# Patient Record
Sex: FEMALE | Race: WHITE
[De-identification: names, ages, dates, MRNs, and addresses within clinical notes are randomized per-mention and may not be internally consistent; named-entity substitution may affect disease eponyms.]

---

## 2017-04-23 ENCOUNTER — HOSPITAL ENCOUNTER (EMERGENCY)
Dept: HOSPITAL 4 - SED | Age: 47
Discharge: HOME | End: 2017-04-23
Payer: MEDICAID

## 2017-04-23 VITALS — WEIGHT: 159 LBS | HEIGHT: 62 IN | BODY MASS INDEX: 29.26 KG/M2

## 2017-04-23 VITALS — SYSTOLIC BLOOD PRESSURE: 131 MMHG
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
## 2017-04-23 DIAGNOSIS — S60.221A: Primary | ICD-10-CM
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
## 2017-04-23 DIAGNOSIS — Z88.1: ICD-10-CM

## 2017-04-23 NOTE — NUR
Patient given written and verbal discharge instructions and verbalizes 
understanding.  ER MD Patel discussed with patient the results and treatment 
provided.  Patient in stable condition. ID arm band removed.  

Rx of motrin given. Patient educated on pain management and to follow up with 
PMD. Pain Scale 0/10.

Opportunity for questions provided and answered.

## 2017-04-23 NOTE — NUR
-------------------------------------------------------------------------------

            *** Note undone in ED - 04/23/17 at 1545 by MOHAN ***            

-------------------------------------------------------------------------------

Pt placed to [facility] 08. Report given to YANETH Jimenez.

## 2017-09-28 ENCOUNTER — HOSPITAL ENCOUNTER (EMERGENCY)
Dept: HOSPITAL 4 - SED | Age: 47
Discharge: HOME | End: 2017-09-28
Payer: MEDICAID

## 2017-09-28 VITALS — WEIGHT: 160 LBS | BODY MASS INDEX: 29.44 KG/M2 | HEIGHT: 62 IN

## 2017-09-28 VITALS — SYSTOLIC BLOOD PRESSURE: 140 MMHG
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
## 2017-09-28 DIAGNOSIS — S43.402A: Primary | ICD-10-CM
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
## 2020-01-09 ENCOUNTER — HOSPITAL ENCOUNTER (INPATIENT)
Dept: HOSPITAL 26 - MED | Age: 50
LOS: 3 days | Discharge: HOME | DRG: 282 | End: 2020-01-12
Payer: COMMERCIAL

## 2020-01-09 ENCOUNTER — HOSPITAL ENCOUNTER (EMERGENCY)
Dept: HOSPITAL 26 - MED | Age: 50
Discharge: HOME | End: 2020-01-09
Payer: COMMERCIAL

## 2020-01-09 VITALS — WEIGHT: 155 LBS | HEIGHT: 63 IN | BODY MASS INDEX: 27.46 KG/M2

## 2020-01-09 VITALS — SYSTOLIC BLOOD PRESSURE: 153 MMHG | DIASTOLIC BLOOD PRESSURE: 77 MMHG

## 2020-01-09 VITALS — DIASTOLIC BLOOD PRESSURE: 87 MMHG | SYSTOLIC BLOOD PRESSURE: 140 MMHG

## 2020-01-09 VITALS — SYSTOLIC BLOOD PRESSURE: 143 MMHG | DIASTOLIC BLOOD PRESSURE: 78 MMHG

## 2020-01-09 VITALS — HEIGHT: 63 IN | WEIGHT: 155 LBS | BODY MASS INDEX: 27.46 KG/M2

## 2020-01-09 VITALS — SYSTOLIC BLOOD PRESSURE: 153 MMHG | DIASTOLIC BLOOD PRESSURE: 81 MMHG

## 2020-01-09 VITALS — DIASTOLIC BLOOD PRESSURE: 85 MMHG | SYSTOLIC BLOOD PRESSURE: 151 MMHG

## 2020-01-09 DIAGNOSIS — K57.30: ICD-10-CM

## 2020-01-09 DIAGNOSIS — K76.0: ICD-10-CM

## 2020-01-09 DIAGNOSIS — Z90.49: ICD-10-CM

## 2020-01-09 DIAGNOSIS — D50.9: ICD-10-CM
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
## 2020-01-09 DIAGNOSIS — E44.1: ICD-10-CM

## 2020-01-09 DIAGNOSIS — K75.89: ICD-10-CM

## 2020-01-09 DIAGNOSIS — Z79.899: ICD-10-CM

## 2020-01-09 DIAGNOSIS — K85.90: Primary | ICD-10-CM

## 2020-01-09 DIAGNOSIS — G89.4: ICD-10-CM

## 2020-01-09 DIAGNOSIS — M06.9: ICD-10-CM

## 2020-01-09 DIAGNOSIS — T45.1X5A: ICD-10-CM

## 2020-01-09 DIAGNOSIS — K59.00: Primary | ICD-10-CM

## 2020-01-09 LAB
ALBUMIN FLD-MCNC: 2.3 G/DL (ref 3.4–5)
ANION GAP SERPL CALCULATED.3IONS-SCNC: 10.6 MMOL/L (ref 8–16)
AST SERPL-CCNC: 148 U/L (ref 15–37)
BILIRUB SERPL-MCNC: 1.3 MG/DL (ref 0–1)
BUN SERPL-MCNC: 14 MG/DL (ref 7–18)
CHLORIDE SERPL-SCNC: 103 MMOL/L (ref 98–107)
CO2 SERPL-SCNC: 26.6 MMOL/L (ref 21–32)
CREAT SERPL-MCNC: 0.8 MG/DL (ref 0.6–1.3)
EOSINOPHIL NFR BLD MANUAL: 1 % (ref 0–4)
ERYTHROCYTE [DISTWIDTH] IN BLOOD BY AUTOMATED COUNT: 20.4 % (ref 11.6–13.7)
GFR SERPL CREATININE-BSD FRML MDRD: 98 ML/MIN (ref 90–?)
GLUCOSE SERPL-MCNC: 109 MG/DL (ref 74–106)
HCT VFR BLD AUTO: 25.6 % (ref 36–48)
HGB BLD-MCNC: 7.6 G/DL (ref 12–16)
LIPASE SERPL-CCNC: 473 U/L (ref 73–393)
LYMPHOCYTES NFR BLD MANUAL: 16 % (ref 20–46)
MCH RBC QN AUTO: 19 PG (ref 27–31)
MCHC RBC AUTO-ENTMCNC: 30 G/DL (ref 33–37)
MCV RBC AUTO: 63.5 FL (ref 80–94)
MONOCYTES NFR BLD MANUAL: 15 % (ref 5–12)
PLATELET # BLD AUTO: 436 K/UL (ref 140–450)
POTASSIUM SERPL-SCNC: 3.2 MMOL/L (ref 3.5–5.1)
RBC # BLD AUTO: 4.04 MIL/UL (ref 4.2–5.4)
SODIUM SERPL-SCNC: 137 MMOL/L (ref 136–145)
WBC # BLD AUTO: 12.7 K/UL (ref 4.8–10.8)

## 2020-01-09 PROCEDURE — 99284 EMERGENCY DEPT VISIT MOD MDM: CPT

## 2020-01-09 PROCEDURE — 84702 CHORIONIC GONADOTROPIN TEST: CPT

## 2020-01-09 PROCEDURE — 80053 COMPREHEN METABOLIC PANEL: CPT

## 2020-01-09 PROCEDURE — 74022 RADEX COMPL AQT ABD SERIES: CPT

## 2020-01-09 PROCEDURE — 96374 THER/PROPH/DIAG INJ IV PUSH: CPT

## 2020-01-09 PROCEDURE — 83690 ASSAY OF LIPASE: CPT

## 2020-01-09 PROCEDURE — 36415 COLL VENOUS BLD VENIPUNCTURE: CPT

## 2020-01-09 PROCEDURE — 85025 COMPLETE CBC W/AUTO DIFF WBC: CPT

## 2020-01-09 PROCEDURE — 96375 TX/PRO/DX INJ NEW DRUG ADDON: CPT

## 2020-01-09 RX ADMIN — HYDROMORPHONE HYDROCHLORIDE PRN MG: 1 INJECTION, SOLUTION INTRAMUSCULAR; INTRAVENOUS; SUBCUTANEOUS at 21:27

## 2020-01-09 RX ADMIN — DEXTROSE AND SODIUM CHLORIDE SCH MLS/HR: 5; .9 INJECTION, SOLUTION INTRAVENOUS at 18:07

## 2020-01-09 RX ADMIN — HYDROMORPHONE HYDROCHLORIDE PRN MG: 1 INJECTION, SOLUTION INTRAMUSCULAR; INTRAVENOUS; SUBCUTANEOUS at 17:27

## 2020-01-09 RX ADMIN — HYDROMORPHONE HYDROCHLORIDE PRN MG: 1 INJECTION, SOLUTION INTRAMUSCULAR; INTRAVENOUS; SUBCUTANEOUS at 19:55

## 2020-01-09 RX ADMIN — SODIUM CHLORIDE PRN MG: 9 INJECTION, SOLUTION INTRAVENOUS at 18:07

## 2020-01-09 RX ADMIN — PANTOPRAZOLE SODIUM SCH MG: 40 TABLET, DELAYED RELEASE ORAL at 14:00

## 2020-01-09 NOTE — NUR
PATIENT UP AND OUT OF BED TO USE THE RESTROOM, HAD A LARGE BOWEL MOVEMENT FINALLY BUT ALERTS 
RN THAT SHE NEEDS TO GO MORE. BACK IN BED WITH IV FLUIDS INFUSING, CALL LIGHT WITHIN REACH, 
BILLYAILS UPx2.

## 2020-01-09 NOTE — NUR
PATIENT GIVEN IVP DILAUDID AS ORDERED. PATIENT BACK IN BED AND RESTING, FACIAL GRIMACING 
NOTED. DAUGHTER AT BEDSIDE. ALL NEEDS MET AT THIS TIME.

## 2020-01-09 NOTE — NUR
Patient will be admitted to care of DR SAMUEL. Admited to MS.  Will go to 
room 119B. Belongings list completed.  Report to JESSY BURR.

## 2020-01-09 NOTE — NUR
PATIENT RECEIVED MORPHINE IVP FOR SEVERE ABDOMINAL PAIN. WILL CONTINUE TO MONITOR FOR PAIN 
MANAGEMENT. PATIENT [id number]. DAUGHTER AT BEDSIDE. CALL LIGHT WITHIN REACH.

## 2020-01-09 NOTE — NUR
DC PLANNIN YRS OLD FEMALE PT WAS ADMITTED FROM HOME WITH A DX OF ABDOMINAL PAIN. PT HAS NO MEDICAL 
HX AND JUST RELEASED FROM [facility]. CT ABD/PELVIS SHOWED DIVERTICULITIS. 
ADMINISTERED IVF, PAIN MEDS AND ZOFRAN. DC PLAN TO GO HOME WHEN STABLE. CM TO FOLLOW. 

-------------------------------------------------------------------------------

Addendum: 01/10/20 at 1123 by Kathy Shahid CM

-------------------------------------------------------------------------------

DC PLANNING:

GI CONSULT WITH DR MASTERS , KEPT PT NPO  EGD TODAY .DC PLAN TO GO HOME WHEN STABLE CM TO 
FOLLOW.

-------------------------------------------------------------------------------

Addendum: 20 at 1610 by Fartun Lackey CM

-------------------------------------------------------------------------------

S/P COLONOSCOPY 2020 RE: DIVERTICULOSIS OF THE COLON.  FOR DC HOME TODAY.

## 2020-01-09 NOTE — NUR
RECEIVED REPORT FROM DELFINA MENA RN. PATIENT ARRIVED FROM ER VIA W/C. PATIENT DX: ABDOMINAL 
PAIN. PATIENT IS ALERT, AWAKE AND ORIENTED X4. PER RAJESH PT RECEIVED 2 DOSES OF MORPHINE IN 
THE ER FOR SEVERE PAIN. IV INTACT AND PATENT TO LEFT AC. PATIENT'S SKIN INTACT. BED IN LOW 
POSITION. SAFETY MEASURES IN PLACE. CALL LIGHT WITHIN REACH.

## 2020-01-09 NOTE — NUR
Patient discharged with v/s stable. Written and verbal after care instructions 
given and explained. 

Patient alert, oriented and verbalized understanding of instructions. Wheel 
Chair Assisted with to car. All questions addressed prior to discharge. ID band 
removed. Patient advised to follow up with PMD. Rx of OXYCODONE, ZOFRAN, 
GOLYTELY given. Patient educated on indication of medication including possible 
reaction and side effects. Opportunity to ask questions provided and answered.

## 2020-01-09 NOTE — NUR
BIBA C/O LEFT BACK AND ABD PAIN X 1 WEEK. PT GOT D/C FROM [facility] TODAY AND 
SAID SHE WAS STILL HAVING PAIN. DEC 18TH WENT TO [facility] FOR FEVER, PAIN 
AND WAS SENT HOME WITH TYLENOL EXTRA STRENTH AND IBUPROFEN AND WAS ADVICE TO 
TAKE THEM AT THE SAME TIME AND ENDED UP HAVING LIVER DAMAGE. THEN WENT TO [facility] FOR LEFT BACK AND ABD PAIN.



GINA.





PMH: ILIANA

## 2020-01-09 NOTE — NUR
RECEIVED REPORT FROM DAYSHIFT NURSE AT PATIENTS BEDSIDE. PATIENT AWAKE AND ALERT AND BENDING 
OVER IN PAIN, MOANING AND RUBBING STOMACH.ASSISTED WITH REPOSITIONING. ON ROOM AIR, LUNG 
SOUNDS CLEAR. GENERAL ABDOMEN IS TENDER TO TOUCH, NONDISTENDED, SOFT. BOWEL SOUNDS ARE 
HYPOACTIVE, PATIENT COMPLAINING OF CONSTIPATION. LEFT AC PERIPHRAL IV, 20G, FLUSHED AND 
PATENT. IV FLUIDS INFUSING-D5NS @ 50ML/HR. SKIN IS WARM AND DRY, ALL VITALS WITHIN NORMAL 
LIMITS. UPDATED ON CARE PLAN AND NPO STATUS AFTER MIDNIGHT, ORIENTED TO CALL LIGHT, 
VERBALIZES UNDERSTANDING. WILL FOLLOWUP WITH PAIN ASSESSMENT.

## 2020-01-09 NOTE — NUR
PATIENT IN STABLE CONDITION. MEDICATED FOR PAIN WITH DILAUDID IVP AS ORDERED. WILL ENDORSE 
TO NIGHT NURSE FOR CONTINUITY OF CARE.

## 2020-01-09 NOTE — NUR
49/F BIB FAMILY C/O N/V & LUQ PAIN RADIATING TO EPIGASTRIC REGION X 0400.  SEEN 
HERE  AT 1.28 AM  TODAY WITH SAME S/S. LAST BM 7 DAYS AGO. PATIENT STATES PAIN 
OF 10/10 AT THIS TIME. PATIENT POSITIONED FOR COMFORT; HOB ELEVATED; BEDRAILS 
UP X1; BED DOWN. ER MD MADE AWARE OF PT STATUS.

## 2020-01-10 VITALS — DIASTOLIC BLOOD PRESSURE: 98 MMHG | SYSTOLIC BLOOD PRESSURE: 138 MMHG

## 2020-01-10 VITALS — SYSTOLIC BLOOD PRESSURE: 139 MMHG | DIASTOLIC BLOOD PRESSURE: 85 MMHG

## 2020-01-10 VITALS — DIASTOLIC BLOOD PRESSURE: 86 MMHG | SYSTOLIC BLOOD PRESSURE: 143 MMHG

## 2020-01-10 VITALS — DIASTOLIC BLOOD PRESSURE: 97 MMHG | SYSTOLIC BLOOD PRESSURE: 152 MMHG

## 2020-01-10 VITALS — DIASTOLIC BLOOD PRESSURE: 87 MMHG | SYSTOLIC BLOOD PRESSURE: 137 MMHG

## 2020-01-10 LAB
BARBITURATES UR QL SCN: (no result) NG/ML
BENZODIAZ UR QL SCN: (no result) NG/ML
BZE UR QL SCN: (no result) NG/ML
CANNABINOIDS UR QL SCN: (no result) NG/ML
OPIATES UR QL SCN: (no result) NG/ML
PCP UR QL SCN: (no result) NG/ML

## 2020-01-10 PROCEDURE — 0DB68ZX EXCISION OF STOMACH, VIA NATURAL OR ARTIFICIAL OPENING ENDOSCOPIC, DIAGNOSTIC: ICD-10-PCS

## 2020-01-10 RX ADMIN — PANTOPRAZOLE SODIUM SCH MG: 40 TABLET, DELAYED RELEASE ORAL at 09:13

## 2020-01-10 RX ADMIN — HYDROCODONE BITARTRATE AND ACETAMINOPHEN PRN TAB: 5; 325 TABLET ORAL at 09:14

## 2020-01-10 RX ADMIN — FENTANYL CITRATE ONE MG: 50 INJECTION, SOLUTION INTRAMUSCULAR; INTRAVENOUS at 13:37

## 2020-01-10 RX ADMIN — METOCLOPRAMIDE SCH MG: 5 INJECTION, SOLUTION INTRAMUSCULAR; INTRAVENOUS at 17:11

## 2020-01-10 RX ADMIN — HYDROMORPHONE HYDROCHLORIDE PRN MG: 1 INJECTION, SOLUTION INTRAMUSCULAR; INTRAVENOUS; SUBCUTANEOUS at 09:51

## 2020-01-10 RX ADMIN — SODIUM CHLORIDE PRN MG: 9 INJECTION, SOLUTION INTRAVENOUS at 21:41

## 2020-01-10 RX ADMIN — MIDAZOLAM HYDROCHLORIDE ONE MG: 1 INJECTION INTRAMUSCULAR; INTRAVENOUS at 12:45

## 2020-01-10 RX ADMIN — HYDROMORPHONE HYDROCHLORIDE PRN MG: 1 INJECTION, SOLUTION INTRAMUSCULAR; INTRAVENOUS; SUBCUTANEOUS at 05:47

## 2020-01-10 RX ADMIN — POLYETHYLENE GLYCOL 3350 SCH GM: 17 POWDER, FOR SOLUTION ORAL at 21:42

## 2020-01-10 RX ADMIN — FOLIC ACID SCH MG: 1 TABLET ORAL at 09:13

## 2020-01-10 RX ADMIN — DEXTROSE AND SODIUM CHLORIDE SCH MLS/HR: 5; .9 INJECTION, SOLUTION INTRAVENOUS at 12:43

## 2020-01-10 RX ADMIN — FENTANYL CITRATE ONE MG: 50 INJECTION, SOLUTION INTRAMUSCULAR; INTRAVENOUS at 12:46

## 2020-01-10 RX ADMIN — SENNOSIDES A AND B SCH MG: 8.6 TABLET, FILM COATED ORAL at 17:11

## 2020-01-10 RX ADMIN — MIDAZOLAM HYDROCHLORIDE ONE MG: 1 INJECTION INTRAMUSCULAR; INTRAVENOUS at 13:38

## 2020-01-10 RX ADMIN — HYDROMORPHONE HYDROCHLORIDE PRN MG: 1 INJECTION, SOLUTION INTRAMUSCULAR; INTRAVENOUS; SUBCUTANEOUS at 14:24

## 2020-01-10 RX ADMIN — HYDROCODONE BITARTRATE AND ACETAMINOPHEN PRN TAB: 5; 325 TABLET ORAL at 04:56

## 2020-01-10 RX ADMIN — DEXTROSE AND SODIUM CHLORIDE SCH MLS/HR: 5; .9 INJECTION, SOLUTION INTRAVENOUS at 23:24

## 2020-01-10 RX ADMIN — HYDROMORPHONE HYDROCHLORIDE PRN MG: 1 INJECTION, SOLUTION INTRAMUSCULAR; INTRAVENOUS; SUBCUTANEOUS at 01:27

## 2020-01-10 RX ADMIN — HYDROMORPHONE HYDROCHLORIDE PRN MG: 1 INJECTION, SOLUTION INTRAMUSCULAR; INTRAVENOUS; SUBCUTANEOUS at 17:12

## 2020-01-10 RX ADMIN — HYDROMORPHONE HYDROCHLORIDE PRN MG: 1 INJECTION, SOLUTION INTRAMUSCULAR; INTRAVENOUS; SUBCUTANEOUS at 23:16

## 2020-01-10 RX ADMIN — AMITRIPTYLINE HYDROCHLORIDE SCH MG: 25 TABLET, FILM COATED ORAL at 21:42

## 2020-01-10 RX ADMIN — SODIUM SULFATE, POTASSIUM SULFATE, MAGNESIUM SULFATE SCH ML: 17.5; 3.13; 1.6 SOLUTION, CONCENTRATE ORAL at 20:43

## 2020-01-10 NOTE — NUR
PATIENT SEEN TAKING LAPS ON UNIT, PATIENT WALKING WITH IV POLE, IV FLUIDS CONTINUOUSLY 
INFUSING. GAIT IS NORMAL, PATIENT STATES PAIN IS NOT AS BAD. NO INCIDENTS NOTED, WILL 
CONTINUE TO MONITOR.

## 2020-01-10 NOTE — NUR
PATIENT GIVEN IVP DILAUDID FOR PAIN. PATIENT CONTINUES TO CRY/MOAN OUT LOUD. HAS HOME CREAM 
FROM HOME THAT SHE APPLIED TO STOMACH. PATIENT ABLE TO PASS STOOLS BUT ABDOMEN STILL LARGE 
AND TENDER. POSITIONED PATIENT FOR COMFORT. CALL LIGHT WITHIN REACH

## 2020-01-10 NOTE — NUR
PATIENT RESTING WELL ON CHAIR AT BEDSIDE, EYES CLOSED, MOANING A LITTLE, IVP DILAUDID WAS 
GIVEN. PATIENT ABLE TO SELF TURN AND REPOSITION. NPO AT MIDNIGHT SIGN IN PLACE AND ALL 
FOOD/BEVERAGES TAKEN FROM BEDSIDE.

## 2020-01-10 NOTE — NUR
RECEIVED BEDSIDE REPORT FROM NIGHT SHIFT NURSE. PT IS AWAKE AND ALERT, C/O ABD PAIN AND 
ASKING ABOUT HER NEXT DOSE OF DILAUDID. WILL MEDICATE AS SOON AS APPROPRIATE. PT'S DAUGHTER 
IS AT BEDSIDE. PT IS AMBULATORY AND ABLE TO MAKE NEEDS KNOWN. ON ROOM AIR, SKIN IS INTACT. 
IV SITE L AC 20 G, INFUSING D5NS 50 ML/HR. CALL LIGHT WITHIN REACH.

## 2020-01-10 NOTE — NUR
ADMINISTERED SCHEDULED BOWEL PREP SOLUTION AS ORDERED. PT TEACHING AND INSTRUCTIONS GIVEN, 
PT VERBALIZED UNDERSTANDING.

## 2020-01-10 NOTE — NUR
PER CHARGE NURSE, PATIENT ABLE TO HAVE PO MEDS WITH NPO AFTER MIDNIGHT STATUS, CONFIRMED 
SMALL SIP OF WATER OK TO TAKE WITH MEDS. WILL CARRY OUT.

## 2020-01-10 NOTE — NUR
GAVE PT A  COLLECTION CUP AND COLLECTION HAT FOR URINE AND STOOL SAMPLES.  PT AWARE THAT WE 
NEED BOTH SAMPLES.

## 2020-01-10 NOTE — NUR
PAGED DR MASTERS FOR VOMITUS X 4 AND VOMITED THE BOWEL PREP AN HOUR AFTER DRINKING IT. PAGED ON 
CALL  FOR PT'S REQUEST FOR CHANGEIN FREQUENCY OF PAIN MEDICATION.

## 2020-01-10 NOTE — NUR
PATIENT AWAKE AND UP WALKING LAPS AROUND UNIT. STATES THE WALKING DISTRACTS HER FROM THE 
PAIN. NO INJURIES/INCIDENTS NOTED.

## 2020-01-10 NOTE — NUR
RECEIVED BEDSIDE REPORT FROM NIGHT SHIFT RN FOR PT'S CONTINUITY OF CARE. PT IS IN BED, C/O 
PAIN, AND N/V. PT IS AAOX4, ON ROOM AIR, HAS LEFT AC 20G WITH D5NS AT 100ML/HR, INFORMED PT 
RE: PAIN MEDICATION SCHEDULE, AND ANTIEMETIC MEDS. PT VERBALIZED UNDERSTANDING. WILL MONITOR 
PT THROUGHOUT SHIFT.

## 2020-01-10 NOTE — NUR
RAFI assessment/discharge plan



 Name: 

Leatha Almaguer

Home Tel: 

[phone]

Relationship: 

daughter

Pre-Admission Living Arrangements: 

Lives with Other

Other: 

family

Prior ADL 

 Independent

Current Home Health Name/Tel: 

N/A

Current DME/02 Name/Tel: 

N/A

Current Hospice Name/Tel: 

N/A

Current Dialysis Name/Tel: 

N/A

Healthcare Decision Maker: 

Patient

Advance Directive 

No

Information Taught: 

 Advance Directive

Person Taught: 

Patient

Teaching Tools: 

Computer Generated Print

Verbal

Factors Affecting Learning: 

None

Participation Level: 

 Active

Evaluation: 

Gestures Understanding

Verbalizes Understanding

Educator: 

Elizabeth Reyes, MSW

Discipline: 

Case Mgt/Social Svcs

Tentative Discharge Plan Summary: 

Patient is a 49 year old female admitted for abdominal pain. I met with 

patient, patient's daughter Leatha Henson, and patient's mother Debra Reyes at bedside. Patient alert and oriented x4. Patient lives at home 

with her family and plans to return home upon discharge. Patient goes to 

[facility] for medical care ([place], [place]) and does not have any 

difficulty filling her prescription. She has a hx of mental health. She 

reported one of her daughters was diagnosed with Bipolar and she is very 

familiar with counseling/mental health services. Patient and patient's 

family do not have any questions/concerns at this time.  

and/or  will follow up as needed.

 Signature: 

Elizabeth Reyes, MSW

Date: 

Robert 10, 2020

## 2020-01-10 NOTE — NUR
PATIENT HAS BEEN SCREENED AND CATEGORIZED AS MODERATE NUTRITION RISK. PATIENT WILL BE SEEN 
WITHIN 3-5 DAYS OF ADMISSION.



01/12/20 01/14/20



TIANA MARTINI RD

## 2020-01-10 NOTE — NUR
PT WAS ENDORSE AT BEDSIDE, V/S AS FOLLOWS: T 98.4 P 98 R 18 B/P 137/76 02 98% ON ROOM AIR. 
PT FAMILY MEMBERS AT BEDSIDE. PT MOTHER SAID " MY DAUGHTER HAS SOME FLUIDS DUE!" PT MOTHER 
WAS RUDE AND DEMANDING TO PRIMARY NURSE, PT WAS REASSURED BY PRIMARY NURSE THAT SHE WILL 
RECEIVE THE CORRECT FLUIDS IN A TIMELY MANNER, PRIMARY NURSE ASKED TO PLEASE CHANGE 
ASSIGNMENTS DUE TO THE VERBAL ABUSE OF THE FAMILY MEMBER . ASSIGNMENT WAS GIVEN TO LEIGH 
AT BEDSIDE.

## 2020-01-10 NOTE — NUR
PT IS BACK FROM EGD. TOLERATED PROCEDURE WELL. DENIES PAIN AT THIS TIME. VS UPON RETURN: BP 
143/86, HR 92, O2 95% ON RA, RR 16, TEMP 98.7

## 2020-01-10 NOTE — NUR
DR MASTERS AND DR OSULLIVAN CALLED FOR NEW ORDERS. ADMINISTERED NEW ORDERS OF REGLAN AND IVP PAIN 
MEDICATION. PER DR MASTERS, GIVE REGLAN FIRST, WAIT TO GIVE THE NEXT BOWEL PREP AND INSTRUCT PT 
TO DRINK SLOWLY AND INTERMITTENTLY, IF PT DOES NOT TOLERATE ANY FLUIDS, DO NOT GIVE ANY MORE 
OF THE BOWEL PREP. PT TEACHING GIVEN, PT VERBALIZED UNDERSTANDING.

## 2020-01-10 NOTE — NUR
PT VOMITED LARGE AMOUNT OF FLUID, SUSPECTED TO BE THE BOWEL PREP GIVEN. WILL INFORM MD AND 
WAIT FOR FURTHER ORDERS.

## 2020-01-11 VITALS — SYSTOLIC BLOOD PRESSURE: 136 MMHG | DIASTOLIC BLOOD PRESSURE: 86 MMHG

## 2020-01-11 VITALS — SYSTOLIC BLOOD PRESSURE: 123 MMHG | DIASTOLIC BLOOD PRESSURE: 70 MMHG

## 2020-01-11 VITALS — DIASTOLIC BLOOD PRESSURE: 77 MMHG | SYSTOLIC BLOOD PRESSURE: 119 MMHG

## 2020-01-11 LAB
ALBUMIN FLD-MCNC: 2.4 G/DL (ref 3.4–5)
ALBUMIN FLD-MCNC: 2.4 G/DL (ref 3.4–5)
ANION GAP SERPL CALCULATED.3IONS-SCNC: 11.9 MMOL/L (ref 8–16)
AST SERPL-CCNC: 85 U/L (ref 15–37)
AST SERPL-CCNC: 86 U/L (ref 15–37)
BILIRUB DIRECT SERPL-MCNC: 0.5 MG/DL (ref 0–0.3)
BILIRUB SERPL-MCNC: 1.2 MG/DL (ref 0–1)
BILIRUB SERPL-MCNC: 1.2 MG/DL (ref 0–1)
BUN SERPL-MCNC: 4 MG/DL (ref 7–18)
CHLORIDE SERPL-SCNC: 106 MMOL/L (ref 98–107)
CO2 SERPL-SCNC: 24.8 MMOL/L (ref 21–32)
CREAT SERPL-MCNC: 0.7 MG/DL (ref 0.6–1.3)
EOSINOPHIL NFR BLD MANUAL: 2 % (ref 0–4)
ERYTHROCYTE [DISTWIDTH] IN BLOOD BY AUTOMATED COUNT: 20.6 % (ref 11.6–13.7)
GFR SERPL CREATININE-BSD FRML MDRD: 115 ML/MIN (ref 90–?)
GLUCOSE SERPL-MCNC: 102 MG/DL (ref 74–106)
HCT VFR BLD AUTO: 24.6 % (ref 36–48)
HGB BLD-MCNC: 7.4 G/DL (ref 12–16)
LYMPHOCYTES NFR BLD MANUAL: 24 % (ref 20–46)
MCH RBC QN AUTO: 20 PG (ref 27–31)
MCHC RBC AUTO-ENTMCNC: 30 G/DL (ref 33–37)
MCV RBC AUTO: 65 FL (ref 80–94)
MONOCYTES NFR BLD MANUAL: 12 % (ref 5–12)
NRBC BLD MANUAL-RTO: 1 /100WBC (ref 0–0)
PLATELET # BLD AUTO: 636 K/UL (ref 140–450)
POTASSIUM SERPL-SCNC: 3.7 MMOL/L (ref 3.5–5.1)
RBC # BLD AUTO: 3.79 MIL/UL (ref 4.2–5.4)
SODIUM SERPL-SCNC: 139 MMOL/L (ref 136–145)
WBC # BLD AUTO: 12.3 K/UL (ref 4.8–10.8)

## 2020-01-11 PROCEDURE — 0DJD8ZZ INSPECTION OF LOWER INTESTINAL TRACT, VIA NATURAL OR ARTIFICIAL OPENING ENDOSCOPIC: ICD-10-PCS

## 2020-01-11 RX ADMIN — DEXTROSE AND SODIUM CHLORIDE SCH MLS/HR: 5; .9 INJECTION, SOLUTION INTRAVENOUS at 09:42

## 2020-01-11 RX ADMIN — FOLIC ACID SCH MG: 1 TABLET ORAL at 08:12

## 2020-01-11 RX ADMIN — SODIUM CHLORIDE PRN MG: 9 INJECTION, SOLUTION INTRAVENOUS at 19:53

## 2020-01-11 RX ADMIN — METOCLOPRAMIDE SCH MG: 5 INJECTION, SOLUTION INTRAMUSCULAR; INTRAVENOUS at 05:43

## 2020-01-11 RX ADMIN — POLYETHYLENE GLYCOL 3350 SCH GM: 17 POWDER, FOR SOLUTION ORAL at 12:32

## 2020-01-11 RX ADMIN — SENNOSIDES A AND B SCH MG: 8.6 TABLET, FILM COATED ORAL at 08:12

## 2020-01-11 RX ADMIN — HYDROMORPHONE HYDROCHLORIDE PRN MG: 1 INJECTION, SOLUTION INTRAMUSCULAR; INTRAVENOUS; SUBCUTANEOUS at 03:49

## 2020-01-11 RX ADMIN — FERROUS SULFATE TAB 325 MG (65 MG ELEMENTAL FE) SCH MG: 325 (65 FE) TAB at 17:36

## 2020-01-11 RX ADMIN — SODIUM SULFATE, POTASSIUM SULFATE, MAGNESIUM SULFATE SCH ML: 17.5; 3.13; 1.6 SOLUTION, CONCENTRATE ORAL at 05:44

## 2020-01-11 RX ADMIN — FERROUS SULFATE TAB 325 MG (65 MG ELEMENTAL FE) SCH MG: 325 (65 FE) TAB at 12:27

## 2020-01-11 RX ADMIN — POLYETHYLENE GLYCOL 3350 SCH GM: 17 POWDER, FOR SOLUTION ORAL at 08:12

## 2020-01-11 RX ADMIN — HYDROMORPHONE HYDROCHLORIDE PRN MG: 1 INJECTION, SOLUTION INTRAMUSCULAR; INTRAVENOUS; SUBCUTANEOUS at 08:12

## 2020-01-11 RX ADMIN — AMITRIPTYLINE HYDROCHLORIDE SCH MG: 25 TABLET, FILM COATED ORAL at 20:03

## 2020-01-11 NOTE — NUR
PATIENT C/O ACHING ABDOMINAL PAIN 9/10, MEDICATED AS ORDERED. LOW STIMULI ENVIRONMENT AND 
LIGHTS DIMMED FOR COMFORT. CALL LIGHT WITHIN REACH. WILL CONTINUE TO MONITOR.

## 2020-01-11 NOTE — NUR
RECEIVED PT ON ROOM AIR WITH AN SP02 OF 97% AND CLEAR BREATH SOUNDS. NO RESPIRATORY DISTRESS 
NOTED AT THIS TIME; HR 94 AND RR 20. HHN PRN TX NOT GIVEN. WILL CONTINUE TO MONITOR PT.

## 2020-01-11 NOTE — NUR
RECEIVED REPORT FROM NIGHT SHIFT NURSE LEIGH FOR CONTINUITY OF CARE. PT IN STABLE 
CONDITION. RESPIRATIONS EVEN AND UNLABORED, ROOM AIR. IV INTACT AND PATENT. SAFETY MEASURES 
IN PLACE. BED IN LOW POSITION. CALL LIGHT AT BEDSIDE. WILL CONTINUE TO MONITOR.

## 2020-01-11 NOTE — NUR
MADE ROUNDS. PT ASLEEP WITH NO SIGNS OF PAIN OR DISTRESS. PER PT'S SISTER, PT HASN'T HAD ANY 
VOMITING. WILL CONTINUE TO MONITOR PT.

## 2020-01-11 NOTE — NUR
VS CHECKED AND CHARTED. PT ASLEEP WITH NO SIGNS OF DISTRESS. NO VOMITUS SINCE THE LAST 
ADMINISTRATION OF REGLAN, PER SISTER. WILL CONTINUE TO MONITOR PT.

## 2020-01-11 NOTE — NUR
PT SLEEPING AT THIS TIME. RESPIRATIONS EVEN AND UNLABORED. BED IN LOW POSITION. CALL LIGHT 
AT BEDSIDE. WILL CONTINUE TO MONITOR.

## 2020-01-11 NOTE — NUR
PT TALKING WITH FAMILY. RESPIRATIONS EVEN AND UNLABORED. BED IN LOW POSITION. CALL LIGHT AT 
BEDSIDE. WILL CONTINUE TO MONITOR.

## 2020-01-11 NOTE — NUR
REASSESSED PAIN LEVEL; PATIENT IS ASLEEP. NO S/SX ACUTE DISTRESS. CALL LIGHT WITHIN REACH. 
WILL CONTINUE TO MONITOR.

## 2020-01-11 NOTE — NUR
PT SITTING UP IN BED IN STABLE CONDITION. TALKING TO FAMILY. RESPIRATIONS EVEN AND 
UNLABORED. BED IN LOW POSITION. CALL LIGHT AT BEDSIDE. WILL CONTINUE TO MONITOR.

## 2020-01-11 NOTE — NUR
RECEIVED REPORT FROM AM SHIFT NURSE AIME IN STABLE CONDITION. MEDSURG PATIENT. RESPIRATIONS 
EVEN, UNLABORED. NO C/O PAIN. NO S/SX ACUTE DISTRESS. SKIN WARM, DRY, INTACT. IV SITE TO 
LEFT HAND 22G INTACT. CALL LIGHT WITHIN REACH. WILL CONTINUE TO MONITOR.

## 2020-01-11 NOTE — NUR
MADE ROUNDS. PATIENT ASLEEP. NO S/SX ACUTE DISTRESS. CALL LIGHT WITHIN REACH. WILL CONTINUE 
TO MONITOR.

## 2020-01-11 NOTE — NUR
PT USED THE RESTROOM, STATES STOOL CONSISTENCY IS LIKE DIARRHEA. C/O PAIN, ADMINISTERED PRN 
IVP PAIN MEDICATION AS ORDERED. EXPLAINED TO PT THAT ANOTHER BOWEL PREP SOLUTION WILL BE 
TRIED OUT TO SEE IF SHE COULD TOLERATE IT. PT VERBALIZED UNDERSTANDING.

## 2020-01-11 NOTE — NUR
PATIENT IN STABLE CONDITION. DUE MEDS GIVEN. NO S/SX ACUTE DISTRESS. CALL LIGHT WITHIN 
REACH. WILL CONTINUE TO MONITOR.

## 2020-01-12 VITALS — SYSTOLIC BLOOD PRESSURE: 123 MMHG | DIASTOLIC BLOOD PRESSURE: 71 MMHG

## 2020-01-12 VITALS — DIASTOLIC BLOOD PRESSURE: 81 MMHG | SYSTOLIC BLOOD PRESSURE: 136 MMHG

## 2020-01-12 LAB
ALBUMIN FLD-MCNC: 2.2 G/DL (ref 3.4–5)
ANION GAP SERPL CALCULATED.3IONS-SCNC: 9.3 MMOL/L (ref 8–16)
AST SERPL-CCNC: 44 U/L (ref 15–37)
BASOPHILS # BLD AUTO: 0.1 K/UL (ref 0–0.22)
BASOPHILS NFR BLD AUTO: 1.2 % (ref 0–2)
BILIRUB SERPL-MCNC: 0.8 MG/DL (ref 0–1)
BUN SERPL-MCNC: 4 MG/DL (ref 7–18)
CHLORIDE SERPL-SCNC: 105 MMOL/L (ref 98–107)
CO2 SERPL-SCNC: 26.3 MMOL/L (ref 21–32)
CREAT SERPL-MCNC: 0.7 MG/DL (ref 0.6–1.3)
EOSINOPHIL # BLD AUTO: 0.2 K/UL (ref 0–0.4)
EOSINOPHIL NFR BLD AUTO: 1.9 % (ref 0–4)
ERYTHROCYTE [DISTWIDTH] IN BLOOD BY AUTOMATED COUNT: 21.5 % (ref 11.6–13.7)
GFR SERPL CREATININE-BSD FRML MDRD: 115 ML/MIN (ref 90–?)
GLUCOSE SERPL-MCNC: 95 MG/DL (ref 74–106)
HCT VFR BLD AUTO: 23.2 % (ref 36–48)
HGB BLD-MCNC: 7 G/DL (ref 12–16)
LYMPHOCYTES # BLD AUTO: 3.2 K/UL (ref 2.5–16.5)
LYMPHOCYTES NFR BLD AUTO: 33.6 % (ref 20.5–51.1)
MCH RBC QN AUTO: 20 PG (ref 27–31)
MCHC RBC AUTO-ENTMCNC: 30 G/DL (ref 33–37)
MCV RBC AUTO: 65.3 FL (ref 80–94)
MONOCYTES # BLD AUTO: 1.4 K/UL (ref 0.8–1)
MONOCYTES NFR BLD AUTO: 14.5 % (ref 1.7–9.3)
NEUTROPHILS # BLD AUTO: 4.6 K/UL (ref 1.8–7.7)
NEUTROPHILS NFR BLD AUTO: 48.8 % (ref 42.2–75.2)
PLATELET # BLD AUTO: 631 K/UL (ref 140–450)
POTASSIUM SERPL-SCNC: 3.6 MMOL/L (ref 3.5–5.1)
RBC # BLD AUTO: 3.55 MIL/UL (ref 4.2–5.4)
SODIUM SERPL-SCNC: 137 MMOL/L (ref 136–145)
WBC # BLD AUTO: 9.4 K/UL (ref 4.8–10.8)

## 2020-01-12 RX ADMIN — FERROUS SULFATE TAB 325 MG (65 MG ELEMENTAL FE) SCH MG: 325 (65 FE) TAB at 08:37

## 2020-01-12 RX ADMIN — FERROUS SULFATE TAB 325 MG (65 MG ELEMENTAL FE) SCH MG: 325 (65 FE) TAB at 12:49

## 2020-01-12 RX ADMIN — POLYETHYLENE GLYCOL 3350 SCH GM: 17 POWDER, FOR SOLUTION ORAL at 08:38

## 2020-01-12 RX ADMIN — FOLIC ACID SCH MG: 1 TABLET ORAL at 08:38

## 2020-01-12 RX ADMIN — SODIUM CHLORIDE PRN MG: 9 INJECTION, SOLUTION INTRAVENOUS at 06:26

## 2020-01-12 RX ADMIN — SODIUM CHLORIDE PRN MG: 9 INJECTION, SOLUTION INTRAVENOUS at 00:12

## 2020-01-12 RX ADMIN — SODIUM CHLORIDE PRN MG: 9 INJECTION, SOLUTION INTRAVENOUS at 12:49

## 2020-01-12 NOTE — NUR
RECEIVED REPORT FROM NIGHT SHIFT NURSE KISSES FOR CONTINUITY OF CARE. PT IN STABLE 
CONDITION. RESPIRATIONS EVEN AND UNLABORED, ROOM AIR. IV INTACT AND PATENT. SAFETY MEASURES 
IN PLACE. BED IN LOW POSITION. CALL LIGHT AT BEDSIDE. WILL CONTINUE TO MONITOR.

## 2020-01-12 NOTE — NUR
MADE ROUNDS. PATIENT IS ASLEEP. NO S/SX ACUTE DISTRESS. CALL LIGHT WITHIN REACH. WILL 
CONTINUE TO MONITOR.

## 2020-01-12 NOTE — NUR
REASSESSED PAIN, PATIENT IS ASLEEP. RESPIRATIONS EVEN, UNLABORED. NO S/SX ACUTE DISTRESS 
NOTED. CALL LIGHT WITHIN REACH. WILL CONTINUE TO MONITOR.

## 2020-01-12 NOTE — NUR
PATIENT CONTINUES IN STABLE CONDITION. NO S/SX ACUTE DISTRESS. NO C/O PAIN. CALL LIGHT 
WITHIN REACH. WILL CONTINUE TO MONITOR.

## 2020-01-12 NOTE — NUR
PATIENT C/O ACHING ABDOMINAL PAIN 9/10. MEDICATED AS ORDERED. CALL LIGHT WITHIN REACH. WILL 
CONTINUE TO MONITOR.

## 2020-01-12 NOTE — NUR
PT SITTING UP IN BED WATCHING TV AT THIS TIME. RESPIRATIONS EVEN AND UNLABORED. BED IN LOW 
POSITION. CALL LIGHT AT BEDSIDE. WILL CONTINUE TO MONITOR.

## 2020-01-12 NOTE — NUR
PT TALKING WITH FAMILY AT BEDSIDE. RESPIRATIONS EVEN AND UNLABORED. BED IN LOW POSITION. 
CALL LIGHT AT BEDSIDE. WILL CONTINUE TO MONITOR.

## 2020-01-12 NOTE — NUR
GAVE DISCHARGE INSTRUCTIONS AND PRESCRIPTION TO TAKE TO HOME PHARMACY. PT VERBALIZED 
UNDERSTANDING OF INSTRUCTIONS. REMOVED IV, LUMEN INTACT. REMOVED ID BAND. PT WHEELED TO 
LOBBY IN WHEELCHAIR WHERE FAMILY WAITING WITH VEHICLE. PT IN STABLE CONDITION.

## 2023-04-17 ENCOUNTER — HOSPITAL ENCOUNTER (EMERGENCY)
Dept: HOSPITAL 4 - SED | Age: 53
Discharge: HOME | End: 2023-04-17
Payer: COMMERCIAL

## 2023-04-17 VITALS — SYSTOLIC BLOOD PRESSURE: 132 MMHG

## 2023-04-17 VITALS — HEIGHT: 64 IN | BODY MASS INDEX: 26.29 KG/M2 | WEIGHT: 154 LBS

## 2023-04-17 VITALS — SYSTOLIC BLOOD PRESSURE: 141 MMHG
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
## 2023-04-17 DIAGNOSIS — S22.32XA: Primary | ICD-10-CM

## 2023-04-17 DIAGNOSIS — Z88.1: ICD-10-CM
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
## 2023-04-17 DIAGNOSIS — Z79.899: ICD-10-CM
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
## 2023-04-17 PROCEDURE — 90471 IMMUNIZATION ADMIN: CPT

## 2023-04-17 PROCEDURE — 71045 X-RAY EXAM CHEST 1 VIEW: CPT

## 2023-04-17 PROCEDURE — 96372 THER/PROPH/DIAG INJ SC/IM: CPT

## 2023-04-17 PROCEDURE — 90715 TDAP VACCINE 7 YRS/> IM: CPT

## 2023-04-17 PROCEDURE — 71100 X-RAY EXAM RIBS UNI 2 VIEWS: CPT

## 2023-04-17 PROCEDURE — 99284 EMERGENCY DEPT VISIT MOD MDM: CPT

## 2023-04-17 RX ADMIN — CLOSTRIDIUM TETANI TOXOID ANTIGEN (FORMALDEHYDE INACTIVATED), CORYNEBACTERIUM DIPHTHERIAE TOXOID ANTIGEN (FORMALDEHYDE INACTIVATED), BORDETELLA PERTUSSIS TOXOID ANTIGEN (GLUTARALDEHYDE INACTIVATED), BORDETELLA PERTUSSIS FILAMENTOUS HEMAGGLUTININ ANTIGEN (FORMALDEHYDE INACTIVATED), BORDETELLA PERTUSSIS PERTACTIN ANTIGEN, AND BORDETELLA PERTUSSIS FIMBRIAE 2/3 ANTIGEN ONE ML: 5; 2; 2.5; 5; 3; 5 INJECTION, SUSPENSION INTRAMUSCULAR at 22:05

## 2023-04-17 RX ADMIN — IBUPROFEN ONE MG: 800 TABLET ORAL at 22:05

## 2023-04-17 RX ADMIN — OXYCODONE HYDROCHLORIDE AND ACETAMINOPHEN ONE TAB: 5; 325 TABLET ORAL at 22:03

## 2023-04-17 RX ADMIN — MORPHINE SULFATE ONE MG: 4 INJECTION INTRAVENOUS at 21:25

## 2023-04-17 NOTE — NUR
Patient given written and verbal discharge instructions and verbalizes 
understanding.  ER MD ACSTELLANOS discussed with patient the results and 
treatment provided. Patient in stable condition. ID arm band removed.

Rx of IBUPROPHEN AND PERCOCET  given. Patient educated on pain management and 
to follow up with PMD. Pain Scale .

Opportunity for questions provided and answered. Medication side effect fact 
sheet provided.

## 2023-04-17 NOTE — NUR
Patient triaged and placed in waiting room. VSS and patient appears in no acute 
distress at this time. Accompanied by  fam member, awaiting available bed, and 
MD notified of need for MSE.

## 2025-05-16 NOTE — NUR
PCP Appointment:



CORIN contacted patient to provide hospital follow up with [facility]. Patient stated 
she visited her PCP at 1345 on 01/14/2020. No further needs identified. Pt presented to infusion for blood. She had labs drawn 5/1 but needed fresh labs for today. PIV started, labs drawn. Hemoglobin=8.2. Parameter ordered is less than 8, so pt did not meet parameters. Pt is very agreeable with not getting blood today, she is not particularly symptomatic, she will f/u with her providers about next steps. PIV flushed and removed, gauze drsg placed. No further appts needed at this time, left on foot to self care.